# Patient Record
Sex: FEMALE | Race: BLACK OR AFRICAN AMERICAN | NOT HISPANIC OR LATINO | Employment: UNEMPLOYED | URBAN - METROPOLITAN AREA
[De-identification: names, ages, dates, MRNs, and addresses within clinical notes are randomized per-mention and may not be internally consistent; named-entity substitution may affect disease eponyms.]

---

## 2018-10-29 ENCOUNTER — HOSPITAL ENCOUNTER (EMERGENCY)
Facility: MEDICAL CENTER | Age: 33
End: 2018-10-29
Attending: EMERGENCY MEDICINE

## 2018-10-29 ENCOUNTER — APPOINTMENT (OUTPATIENT)
Dept: RADIOLOGY | Facility: MEDICAL CENTER | Age: 33
End: 2018-10-29
Attending: EMERGENCY MEDICINE

## 2018-10-29 VITALS
DIASTOLIC BLOOD PRESSURE: 88 MMHG | BODY MASS INDEX: 26.89 KG/M2 | WEIGHT: 167.33 LBS | SYSTOLIC BLOOD PRESSURE: 132 MMHG | TEMPERATURE: 98.4 F | OXYGEN SATURATION: 98 % | HEART RATE: 77 BPM | RESPIRATION RATE: 18 BRPM | HEIGHT: 66 IN

## 2018-10-29 DIAGNOSIS — N93.9 VAGINAL BLEEDING: ICD-10-CM

## 2018-10-29 DIAGNOSIS — D21.9 FIBROIDS: ICD-10-CM

## 2018-10-29 LAB
ALBUMIN SERPL BCP-MCNC: 4.7 G/DL (ref 3.2–4.9)
ALBUMIN/GLOB SERPL: 1.3 G/DL
ALP SERPL-CCNC: 56 U/L (ref 30–99)
ALT SERPL-CCNC: 17 U/L (ref 2–50)
ANION GAP SERPL CALC-SCNC: 11 MMOL/L (ref 0–11.9)
APPEARANCE UR: ABNORMAL
AST SERPL-CCNC: 20 U/L (ref 12–45)
BACTERIA #/AREA URNS HPF: ABNORMAL /HPF
BACTERIA GENITAL QL WET PREP: NORMAL
BASOPHILS # BLD AUTO: 0.3 % (ref 0–1.8)
BASOPHILS # BLD: 0.02 K/UL (ref 0–0.12)
BILIRUB SERPL-MCNC: 0.3 MG/DL (ref 0.1–1.5)
BILIRUB UR QL STRIP.AUTO: NEGATIVE
BUN SERPL-MCNC: 12 MG/DL (ref 8–22)
CALCIUM SERPL-MCNC: 9.9 MG/DL (ref 8.5–10.5)
CHLORIDE SERPL-SCNC: 109 MMOL/L (ref 96–112)
CO2 SERPL-SCNC: 21 MMOL/L (ref 20–33)
COLOR UR: YELLOW
CREAT SERPL-MCNC: 1.04 MG/DL (ref 0.5–1.4)
EOSINOPHIL # BLD AUTO: 0.06 K/UL (ref 0–0.51)
EOSINOPHIL NFR BLD: 1 % (ref 0–6.9)
EPI CELLS #/AREA URNS HPF: ABNORMAL /HPF
ERYTHROCYTE [DISTWIDTH] IN BLOOD BY AUTOMATED COUNT: 38.5 FL (ref 35.9–50)
GLOBULIN SER CALC-MCNC: 3.5 G/DL (ref 1.9–3.5)
GLUCOSE SERPL-MCNC: 85 MG/DL (ref 65–99)
GLUCOSE UR STRIP.AUTO-MCNC: NEGATIVE MG/DL
HCG SERPL QL: NEGATIVE
HCT VFR BLD AUTO: 38.9 % (ref 37–47)
HGB BLD-MCNC: 12.1 G/DL (ref 12–16)
HYALINE CASTS #/AREA URNS LPF: ABNORMAL /LPF
IMM GRANULOCYTES # BLD AUTO: 0.01 K/UL (ref 0–0.11)
IMM GRANULOCYTES NFR BLD AUTO: 0.2 % (ref 0–0.9)
KETONES UR STRIP.AUTO-MCNC: NEGATIVE MG/DL
LEUKOCYTE ESTERASE UR QL STRIP.AUTO: ABNORMAL
LYMPHOCYTES # BLD AUTO: 2.81 K/UL (ref 1–4.8)
LYMPHOCYTES NFR BLD: 46.5 % (ref 22–41)
MCH RBC QN AUTO: 22.9 PG (ref 27–33)
MCHC RBC AUTO-ENTMCNC: 31.1 G/DL (ref 33.6–35)
MCV RBC AUTO: 73.7 FL (ref 81.4–97.8)
MICRO URNS: ABNORMAL
MONOCYTES # BLD AUTO: 0.51 K/UL (ref 0–0.85)
MONOCYTES NFR BLD AUTO: 8.4 % (ref 0–13.4)
NEUTROPHILS # BLD AUTO: 2.63 K/UL (ref 2–7.15)
NEUTROPHILS NFR BLD: 43.6 % (ref 44–72)
NITRITE UR QL STRIP.AUTO: POSITIVE
NRBC # BLD AUTO: 0 K/UL
NRBC BLD-RTO: 0 /100 WBC
PH UR STRIP.AUTO: 5 [PH]
PLATELET # BLD AUTO: 370 K/UL (ref 164–446)
PMV BLD AUTO: 11.7 FL (ref 9–12.9)
POTASSIUM SERPL-SCNC: 3.9 MMOL/L (ref 3.6–5.5)
PROT SERPL-MCNC: 8.2 G/DL (ref 6–8.2)
PROT UR QL STRIP: NEGATIVE MG/DL
RBC # BLD AUTO: 5.28 M/UL (ref 4.2–5.4)
RBC # URNS HPF: ABNORMAL /HPF
RBC UR QL AUTO: ABNORMAL
SIGNIFICANT IND 70042: NORMAL
SITE SITE: NORMAL
SODIUM SERPL-SCNC: 141 MMOL/L (ref 135–145)
SOURCE SOURCE: NORMAL
SP GR UR STRIP.AUTO: 1.03
UROBILINOGEN UR STRIP.AUTO-MCNC: 0.2 MG/DL
WBC # BLD AUTO: 6 K/UL (ref 4.8–10.8)
WBC #/AREA URNS HPF: ABNORMAL /HPF

## 2018-10-29 PROCEDURE — 99284 EMERGENCY DEPT VISIT MOD MDM: CPT

## 2018-10-29 PROCEDURE — A9270 NON-COVERED ITEM OR SERVICE: HCPCS | Performed by: EMERGENCY MEDICINE

## 2018-10-29 PROCEDURE — 81001 URINALYSIS AUTO W/SCOPE: CPT

## 2018-10-29 PROCEDURE — 700102 HCHG RX REV CODE 250 W/ 637 OVERRIDE(OP): Performed by: EMERGENCY MEDICINE

## 2018-10-29 PROCEDURE — 76856 US EXAM PELVIC COMPLETE: CPT

## 2018-10-29 PROCEDURE — 84703 CHORIONIC GONADOTROPIN ASSAY: CPT

## 2018-10-29 PROCEDURE — 87591 N.GONORRHOEAE DNA AMP PROB: CPT

## 2018-10-29 PROCEDURE — 85025 COMPLETE CBC W/AUTO DIFF WBC: CPT

## 2018-10-29 PROCEDURE — 87491 CHLMYD TRACH DNA AMP PROBE: CPT

## 2018-10-29 PROCEDURE — 80053 COMPREHEN METABOLIC PANEL: CPT

## 2018-10-29 RX ORDER — CEPHALEXIN 500 MG/1
500 CAPSULE ORAL ONCE
Status: COMPLETED | OUTPATIENT
Start: 2018-10-29 | End: 2018-10-29

## 2018-10-29 RX ORDER — CEPHALEXIN 500 MG/1
500 CAPSULE ORAL 4 TIMES DAILY
Qty: 28 CAP | Refills: 0 | Status: SHIPPED | OUTPATIENT
Start: 2018-10-29 | End: 2018-11-05

## 2018-10-29 RX ADMIN — CEPHALEXIN 500 MG: 500 CAPSULE ORAL at 20:58

## 2018-10-29 ASSESSMENT — LIFESTYLE VARIABLES: DO YOU DRINK ALCOHOL: NO

## 2018-10-29 NOTE — ED TRIAGE NOTES
"Chief Complaint   Patient presents with   • Vaginal Bleeding     Pt having vaginal bleeding since having a baby in May. , denies any complications with birth. Vaginal birth. Pt seen Gyn for and given birthcontrol and injections but it is not stopping the bleeding. Gets worse and then better.      +fatigue and some abd cramping when it is heavy.   /95   Pulse 72   Temp 37.2 °C (98.9 °F)   Resp 16   Ht 1.676 m (5' 6\")   Wt 75.9 kg (167 lb 5.3 oz)   SpO2 98%   BMI 27.01 kg/m²   Pt placed back in lobby, educated on triage process, and told to inform staff of any change in condition.     "

## 2018-10-30 NOTE — DISCHARGE INSTRUCTIONS
You were seen in the ER for vaginal bleeding that is likely due to fibroids. I have given you the contact information for the [facility] where you can follow up. Please call them tomorrow to make an appointment. Return to the ER with new or worsening symptoms.

## 2018-10-30 NOTE — ED PROVIDER NOTES
ED Provider Note    Scribed for Tom Francis M.D. by Veronika Buchanan. 10/29/2018, 7:10 PM.    Primary care provider: No primary care provider on file.  Means of arrival: walk-in  History obtained from: patient  History limited by: none    CHIEF COMPLAINT  Chief Complaint   Patient presents with   • Vaginal Bleeding     Pt having vaginal bleeding since having a baby in May. , denies any complications with birth. Vaginal birth. Pt seen Gyn for and given birthcontrol and injections but it is not stopping the bleeding. Gets worse and then better.        HPI  Amaya Jones is a 33 y.o.  female who presents to the Emergency Department with intermittent vaginal bleeding onset five months ago since she gave birth to her child.  She lives in [place] and is visiting here, she will be returning home in January.  The bleeding is intermittent but tonight started once again and was quite heavy which prompted her to present to the emergency department.  She reports going through approximately 1 pad per hour and endorses slowing of the vaginal bleeding since arriving in the emergency department.  When she develops heavier bleeding, she endorses bilateral lower abdominal cramping.  She has not tried over-the-counter pain medications for her symptoms.  Tonight, she was going to purchase iron pills but she discussed her symptoms with her sister who is a physician in [place] and her sister requested that she come to the emergency department.  She endorses occasional chills but denies fevers, chest pain, shortness of breath, nausea, vomiting, dysuria.  Her OB in [place] has given her birth control injections with the hope of stopping the bleeding but this has not been improving her symptoms.  She is not breast-feeding.  She denies anticoagulation.    REVIEW OF SYSTEMS  Pertinent positives include vaginal bleeding, lower abdominal pain, chills. Pertinent negatives include no fever, dysuria. As above, all other  "systems reviewed and are negative.   See HPI for further details.     PAST MEDICAL HISTORY   has a past medical history of Asthma.    SURGICAL HISTORY  patient denies any surgical history    SOCIAL HISTORY  Social History   Substance Use Topics   • Smoking status: Never Smoker   • Smokeless tobacco: Never Used   • Alcohol use No      History   Drug Use No       FAMILY HISTORY  History reviewed. No pertinent family history.    CURRENT MEDICATIONS  Home Medications     Reviewed by Sheryl Dickey R.N. (Registered Nurse) on 10/29/18 at 1606  Med List Status: Complete   Medication Last Dose Status        Patient Laith Taking any Medications                       ALLERGIES  No Known Allergies    PHYSICAL EXAM  VITAL SIGNS: /95   Pulse 72   Temp 37.2 °C (98.9 °F)   Resp 16   Ht 1.676 m (5' 6\")   Wt 75.9 kg (167 lb 5.3 oz)   SpO2 98%   BMI 27.01 kg/m²   Vitals reviewed.  Constitutional: Alert in no apparent distress.  HENT: No signs of trauma, Bilateral external ears normal, Nose normal.  Moist mucous membranes.  Eyes: Pupils are equal and reactive, Conjunctiva normal, Non-icteric.   Neck: Normal range of motion, No tenderness, Supple, No stridor.   Lymphatic: No lymphadenopathy noted.   Cardiovascular: Regular rate and rhythm, no murmurs.   Thorax & Lungs: Normal breath sounds, No respiratory distress, No wheezing, No chest tenderness.   Abdomen: Bowel sounds normal, Soft, No tenderness, No peritoneal signs, No masses, No pulsatile masses.  : Normal external female genitalia.  Scant amount of dark brown blood in the vaginal vault.  Closed cervical loss.  No masses or lesions noted.  No adnexal or cervical motion tenderness.  Skin: Warm, Dry, No erythema, No rash.   Back: Normal alignment.  No CVAT.   Extremities: Intact distal pulses, No edema, No tenderness, No cyanosis  Musculoskeletal: Good range of motion in all major joints. No major deformities noted.   Neurologic: Alert, Normal motor function, Normal " sensory function, No focal deficits noted.   Psychiatric: Affect normal, Judgment normal, Mood normal.     DIAGNOSTIC STUDIES / PROCEDURES    LABS  Labs Reviewed   CBC WITH DIFFERENTIAL - Abnormal; Notable for the following:        Result Value    MCV 73.7 (*)     MCH 22.9 (*)     MCHC 31.1 (*)     Neutrophils-Polys 43.60 (*)     Lymphocytes 46.50 (*)     All other components within normal limits   URINALYSIS - Abnormal; Notable for the following:     Character Cloudy (*)     Nitrite Positive (*)     Leukocyte Esterase Moderate (*)     Occult Blood Trace (*)     All other components within normal limits   URINE MICROSCOPIC (W/UA) - Abnormal; Notable for the following:     WBC  (*)     Bacteria Many (*)     Hyaline Cast 3-5 (*)     All other components within normal limits   COMP METABOLIC PANEL   HCG QUAL SERUM   ESTIMATED GFR   WET PREP   CHLAMYDIA/GC PCR URINE OR SWAB      All labs reviewed by me.    RADIOLOGY  US-PELVIC COMPLETE (TRANSABDOMINAL/TRANSVAGINAL) (COMBO)   Final Result         1.  Fibroid uterine changes and retroverted uterus, otherwise normal transvaginal appearance of the pelvis.        The radiologist's interpretation of all radiological studies have been reviewed by me.    COURSE & MEDICAL DECISION MAKING  Nursing notes, VS, PMSFHx reviewed in chart.  Differential diagnoses include but not limited to: Retained products of conception, menorrhagia, urinary tract infection, fibroid uterus    7:10 PM Patient seen and examined at bedside. Patient arrives afebrile with normal vital signs. Patient appears well hydrated and non-toxic. The physical exam is remarkable for scant dark brown blood in the vaginal vault without adnexal or cervical motion tenderness.  Concern for potential retained products of conception.  Ordered for US-pelvic, urine microscopic, estimated GFR, CBC, CMP, HCG qual, UA to evaluate.    No leukocytosis or anemia which is reassuring.  Metabolic panel completely normal.   Urinalysis suggests infection.  HCG negative.    8:45 PM - Ordered Keflex capsule 500 mg. Ordered wet prep and chlamydia/gc by PCR.    Transvaginal ultrasound reveals fibroid uterine changes without significant acute abnormalities.  This is potentially the reason for the vaginal bleeding.    Unfortunately, wet prep returned after patient was discharged and revealed few motile trichomonads.  No yeast or clue cells noted.  GC/chlamydia pending at time of discharge.  Patient called and we discussed the positive results.  We discussed that it was a sexually transmitted infection and require treatment.  I called her in a prescription [facility] [place].  The patient will pick it up later today and take it.  She does not drink alcohol but I did discuss with her that she would become sick if she drink alcohol while taking the medication.  She is to follow-up with a primary care physician/pregnancy center in the [facility].    The patient will return for new or worsening symptoms and is stable at the time of discharge.    DISPOSITION:  Patient will be discharged home in stable condition.    FOLLOW UP:  [facility]  [address] [place] [id number]  [phone]    Schedule an appointment as soon as possible for a visit in 1 day  For recheck      OUTPATIENT MEDICATIONS:  New Prescriptions    CEPHALEXIN (KEFLEX) 500 MG CAP    Take 1 Cap by mouth 4 times a day for 7 days.         FINAL IMPRESSION  1. Vaginal bleeding    2. Fibroids          Veronika ROACH), am scribing for, and in the presence of, Tom Francis M.D..    Electronically signed by: Veronika Oden), 10/29/2018    Tom ROACH M.D. personally performed the services described in this documentation, as scribed by Veronika Buchanan in my presence, and it is both accurate and complete. C.    The note accurately reflects work and decisions made by me.  Tom Francis  10/30/2018  4:57 AM

## 2018-10-31 LAB
C TRACH DNA SPEC QL NAA+PROBE: NEGATIVE
N GONORRHOEA DNA SPEC QL NAA+PROBE: NEGATIVE
SPECIMEN SOURCE: NORMAL

## 2018-10-31 NOTE — DISCHARGE PLANNING
Pharmacist called, pt called her stating she did not get her Rx for abx.  I called pt, she wants rx called in TOMORROW to a pharmacy that will be open so I have advised her to call back tomorrow and gave direct #.  This CM offered to call in to pharmacy [facility] but for whatever reason pt prefers to call back tomorrow.

## 2018-10-31 NOTE — DISCHARGE PLANNING
Patient called stating she needed a different antibiotic called in for a positive cx result.  Do not see any documentation of that no positive result informed her there is no other script to call I n

## 2018-11-01 NOTE — ED NOTES
"ED Positive Culture Follow-up/Notification Note:    Date: 11/1/18     Patient seen in the ED on 10/29/2018 for intermittent vaginal bleeding x 5 months.   1. Vaginal bleeding    2. Fibroids       Discharge Medication List as of 10/29/2018  8:55 PM      START taking these medications    Details   cephALEXin (KEFLEX) 500 MG Cap Take 1 Cap by mouth 4 times a day for 7 days., Disp-28 Cap, R-0, Print Rx Paper             Allergies: Patient has no known allergies.     Vitals:    10/29/18 1559 10/29/18 1604 10/29/18 2056   BP: 149/95  132/88   Pulse: 72  77   Resp: 16  18   Temp: 37.2 °C (98.9 °F)  36.9 °C (98.4 °F)   SpO2: 98%     Weight:  75.9 kg (167 lb 5.3 oz)    Height:  1.676 m (5' 6\")        Final cultures:   Results     Procedure Component Value Units Date/Time    CHLAMYDIA/GC PCR URINE OR SWAB [[id number]] Collected:  10/29/18 2100    Order Status:  Completed Specimen:  Genital from Genital Updated:  10/31/18 1531     Source Genital     C. trachomatis by PCR Negative     N. gonorrhoeae by PCR Negative    WET PREP [[id number]] Collected:  10/29/18 2100    Order Status:  Completed Specimen:  Genital from Vaginal Updated:  10/29/18 2125     Significant Indicator NEG     Source GEN     Site VAGINAL     Wet Prep For Parasites Many WBC's seen.  Few motile Trichomonas seen.  No yeast.  No clue cells seen.      URINALYSIS [[id number]]  (Abnormal) Collected:  10/29/18 1652    Order Status:  Completed Specimen:  Urine Updated:  10/29/18 1717     Color Yellow     Character Cloudy (A)     Specific Gravity 1.028     Ph 5.0     Glucose Negative mg/dL      Ketones Negative mg/dL      Protein Negative mg/dL      Bilirubin Negative     Urobilinogen, Urine 0.2     Nitrite Positive (A)     Leukocyte Esterase Moderate (A)     Occult Blood Trace (A)     Micro Urine Req Microscopic          Plan:   Patient returned call and states that she is aware of Trichomonas result and concerned for her need for treatment. Informed that physician has " called in [facility] [place].      Marie Andrade

## 2018-11-12 ENCOUNTER — GYNECOLOGY VISIT (OUTPATIENT)
Dept: OBGYN | Facility: CLINIC | Age: 33
End: 2018-11-12

## 2018-11-12 VITALS — WEIGHT: 162 LBS | BODY MASS INDEX: 26.15 KG/M2 | SYSTOLIC BLOOD PRESSURE: 102 MMHG | DIASTOLIC BLOOD PRESSURE: 78 MMHG

## 2018-11-12 DIAGNOSIS — N93.9 ABNORMAL UTERINE BLEEDING (AUB): ICD-10-CM

## 2018-11-12 DIAGNOSIS — A59.9 TRICHOMONAS INFECTION: ICD-10-CM

## 2018-11-12 DIAGNOSIS — N92.1 BREAKTHROUGH BLEEDING ON DEPO-PROVERA: ICD-10-CM

## 2018-11-12 DIAGNOSIS — D25.9 UTERINE LEIOMYOMA, UNSPECIFIED LOCATION: ICD-10-CM

## 2018-11-12 PROCEDURE — 99203 OFFICE O/P NEW LOW 30 MIN: CPT | Performed by: OBSTETRICS & GYNECOLOGY

## 2018-11-12 RX ORDER — MEDROXYPROGESTERONE ACETATE 150 MG/ML
150 INJECTION, SUSPENSION INTRAMUSCULAR ONCE
COMMUNITY

## 2018-11-12 RX ORDER — METRONIDAZOLE 500 MG/1
500 TABLET ORAL ONCE
Qty: 4 TAB | Refills: 0 | Status: SHIPPED | OUTPATIENT
Start: 2018-11-12 | End: 2018-11-12

## 2018-11-12 NOTE — PROGRESS NOTES
Chief Complaint   Patient presents with   • Gynecologic Exam     ER f/u       History of present illness:   33 y.o. presents as an ER ff-up  She went to the ER 2 weeks ago because of profuse vaginal bleeding  She has been having on and off vaginal bleeding x 5 months when the depo was given after having her baby  Pt got 2 doses since May, last deposhot was in October.  She lives in [place], comes [place] the [place] to visit. Pt will be leaving for home in January  Pelvic US done at the ER revealed a 1.2 cm fibroid, review of images seems like has a submucosal component    (+) trichomonas - has not taken flagyl yet; no script sent when she went to her pharmacy to pick it up    Denies any other gyn symptoms    Review of systems:  Pertinent positives documented in HPI and all other systems reviewed & are negative      All PMH, PSH, allergies, social history and FH reviewed and updated today:  History reviewed. No pertinent past medical history.    History reviewed. No pertinent surgical history.    Allergies: No Known Allergies    Social History     Social History   • Marital status: Single     Spouse name: N/A   • Number of children: N/A   • Years of education: N/A     Occupational History   • Not on file.     Social History Main Topics   • Smoking status: Never Smoker   • Smokeless tobacco: Never Used   • Alcohol use No   • Drug use: No   • Sexual activity: Not Currently     Partners: Male     Other Topics Concern   • Not on file     Social History Narrative   • No narrative on file     History reviewed. No pertinent family history.    Physical exam:  Blood pressure 102/78, weight 73.5 kg (162 lb).    General:appears stated age, is in no apparent distress, is well developed and well nourished  Head: normocephalic, non-tender  Abdomen: Bowel sounds positive, nondistended, soft, nontender x4, no rebound or guarding. No organomegaly. No masses.  Female GYN: normal external genitalia  SSE - NEG  SSE - cervix is smooth, no  lesions, no blood in the vault, (+) minimal brownish discharge  BME - cervix - closed no tenderness  Uterus is not enlarged, mobile, no tenderness  Adnexa- no masses, no tenderness  Skin: No rashes, or ulcers or lesions seen  Psychiatric: Patient shows appropriate affect, is alert and oriented x3, intact judgment and insight.    1. Trichomonas infection  metroNIDAZOLE (FLAGYL) 500 MG Tab   2. Breakthrough bleeding on Depo-Provera  REFERRAL TO GYNECOLOGY   3. Abnormal uterine bleeding (AUB)  REFERRAL TO GYNECOLOGY   4. Uterine leiomyoma, unspecified location  REFERRAL TO GYNECOLOGY   5.      Referral for EMB  6.      Other options for LARC method discussed with her. nexplanon vs mirena IUD trial. Pt has desire for future fertility  7.      All questions addressed  8.     RTO for EMB

## 2018-11-12 NOTE — NON-PROVIDER
Pt here for f/u ER visit 10/29/18 Bleeding  Pt states has been bleeding since giving birth in May. Pt was put on bc injections from her OB in [place]  Good# [phone]